# Patient Record
Sex: MALE | Race: WHITE | URBAN - METROPOLITAN AREA
[De-identification: names, ages, dates, MRNs, and addresses within clinical notes are randomized per-mention and may not be internally consistent; named-entity substitution may affect disease eponyms.]

---

## 2019-01-02 ENCOUNTER — HOSPITAL ENCOUNTER (OUTPATIENT)
Age: 59
Discharge: HOME | End: 2019-01-04
Payer: COMMERCIAL

## 2019-01-02 LAB — PROSTATE SPECIFIC ANTIGEN: 2.83 NG/ML (ref 0–4)

## 2019-01-02 PROCEDURE — 84153 ASSAY OF PSA TOTAL: CPT

## 2025-06-11 ENCOUNTER — TRANSCRIBE ORDERS (OUTPATIENT)
Dept: ADMINISTRATIVE | Age: 65
End: 2025-06-11

## 2025-06-11 DIAGNOSIS — J45.998 OTHER ASTHMA: Primary | ICD-10-CM

## 2025-07-25 ENCOUNTER — HOSPITAL ENCOUNTER (OUTPATIENT)
Dept: PULMONOLOGY | Age: 65
Discharge: HOME | End: 2025-07-25
Payer: OTHER GOVERNMENT

## 2025-07-25 DIAGNOSIS — J45.998 OTHER ASTHMA: ICD-10-CM

## 2025-07-25 PROCEDURE — 94060 EVALUATION OF WHEEZING: CPT

## 2025-07-25 NOTE — PROCEDURES
[facility]              [address] [place] [address]                           PULMONARY FUNCTION      PATIENT NAME: VERÓNICA BURTON                 : 1960  MED REC NO: [id number]                        ROOM:   ACCOUNT NO: [id number]                       ADMIT DATE: 2025  PROVIDER: Charli Dickerson MD      DATE OF PROCEDURE: 2025    SURGEON:  Charli Dickerson MD    REFERRING PHYSICIAN:  INDIRA CEBALLOS    INTERPRETING PHYSICIAN:  Charli Dickerson M.D.    CLINICAL INDICATION:  Asthma not otherwise specified, dyspnea on hills and stairs.    PROCEDURE:  Pulmonary function test.    PURPOSE:  Diagnostic.    MEDICATIONS:  Albuterol.    Height 74 inches, weight 195 pounds, age 65, male, nonsmoker.    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  These were what he was exposed to.  The patient had good effort and cooperation.  Albuterol 2.5 mg aerosol given prior to post.  The patient tolerated the procedure well.  The patient states the nebulizer worked better than MDI that he uses.    LUNG MECHANICS:  There appears to be no obstructive or restrictive component of lung disease according to graphic and numeric representation of patient lung volumes.  Pattern of flow volume loop supports this.    FEV1/FVC 0.84.    FEV1 3.77 L, 100% of predicted post-bronchodilator equating to a 90 mL difference or a 2% change.  Z-score 0.02.    FVC 4.76 L, 95% of predicted best pre-bronchodilator with a Z-score of -0.29.    MVV 59% of predicted.    Inspiratory phase of flow volume loop shows adequate upper airway laryngeal and pharyngeal function.  Expiratory phase of flow volume loop shows adequate chest wall strength.    LUNG VOLUMES:  Show a moderate reduction at SVC which is 49%, mild reduction at the IC which is 73%, and a 0% ERV which I assume is not